# Patient Record
Sex: FEMALE | Race: BLACK OR AFRICAN AMERICAN | NOT HISPANIC OR LATINO | Employment: OTHER | URBAN - METROPOLITAN AREA
[De-identification: names, ages, dates, MRNs, and addresses within clinical notes are randomized per-mention and may not be internally consistent; named-entity substitution may affect disease eponyms.]

---

## 2024-11-19 DIAGNOSIS — D75.1 ERYTHROCYTOSIS: ICD-10-CM

## 2024-11-19 DIAGNOSIS — D72.829 LEUKOCYTOSIS: Primary | ICD-10-CM

## 2024-11-26 NOTE — PROGRESS NOTES
Referring provider: Brandi Krishna NP     Reason for consultation: Elevated WBC, Hgb     HPI:  64 year old female with history of GERD hypertension dyslipidemia here for evaluation of elevated white blood cell count and elevated hemoglobin..  Patient had labs on October 31, 2024 that showed white blood cell count 78534 hemoglobin 16 hematocrit 47.5 platelets 224 absolute neutrophil count 8160 absolute lymphocyte count 5470. She has no issues with night sweats fevers or weight loss. She is a smoker and has is trying to quit but not successful.          History reviewed. No pertinent past medical history.    History reviewed. No pertinent surgical history.    No family history on file.    Social History     Socioeconomic History    Marital status:        No current outpatient medications on file.     No current facility-administered medications for this visit.       Review of patient's allergies indicates:  Not on File      Review of systems  CONSTITUTIONAL: no fevers, no chills, no weight loss, no fatigue, no weakness  HEMATOLOGIC: no abnormal bleeding, no abnormal bruising, no drenching night sweats  ONCOLOGIC: no new masses or lumps  HEENT: no vision loss, no tinnitus or hearing loss, no nose bleeding, no dysphagia, no odynophagia  CVS: no chest pain, no palpitations, no dyspnea on exertion  RESP: no shortness of breath, no hemoptysis, no cough  GI: no nausea, no vomiting, no diarrhea, no constipation, no melena, no hematochezia, no hematemesis, no abdominal pain, no increase in abdominal girth  : no dysuria, no hematuria, no hesitancy, no scrotal swelling, no discharge  INTEGUMENT: no rashes, no abnormal bruising, no nail pitting, no hyperpigmentation  NEURO: no falls, no memory loss, no paresthesias or dysesthesias, no urofecal incontinence or retention, no loss of strength on any extremity  MSK: no back pain, no new joint pain, no joint swelling  PSYCH: no suicidal or homicidal ideation, no  depression, no insomnia, no anhedonia  ENDOCRINE: no heat or cold intolerance, no polyuria, no polydipsia      Physical Exam:  Vitals:    11/27/24 1106   BP: 127/80   Pulse: 92   Resp: 14   Temp: 97.7 °F (36.5 °C)       ECOG PS 0  GA: AAOx3, NAD  HEENT: NCAT, PERRLA, EOMI, good dentition, moist oral mucous membranes  LYMPH: no cervical, axillary or supraclavicular adenopathy  CVS: s1s2 RRR, no M/R/G  RESP: CTA b/l, no crackles, no wheezes or rhonchi  ABD: soft, NT, ND, BS+, no hepatosplenomegaly  EXT: no deformities, no pedal edema  SKIN: no rashes, warm and dry  NEURO: normal mentation, strength 5/5 on all 4 extremities, no sensory deficits    LABS:  10/31/24  white blood cell count 08398 hemoglobin 16 hematocrit 47.5 platelets 224 absolute neutrophil count 8160 absolute lymphocyte count 5470  RADIOLOGY:  None  PATHOLOGY  [facility]One  Assessment    1.) Leukocytosis  2.) Erythrocytosis.         Plan   Patient's labs were reviewed in detail and show a significant increase in white blood cell count and a very mild increase in the hematocrit.  Labs were reviewed dating as far back as 2022 and show persistent elevation of lymphocytes and neutrophils as well as hematocrit close to 50 as her baseline.  We will check a EPO level and I do not believe a JAK2 is necessary at this time.  We will check flow cytometry as she does appear to have some increased lymphocyte as well as a SPEP due to bone marrow dysfunction.  I have advised her to quit smoking and to obtain a annual CT of the chest for lung cancer screening.      A total of  45 minutes were spent in review of records, interpretation of test, coordination of care, discussion and counseling with the patient.        Portions of the record may have been created with voice recognition software. Occasional wrong-word or sound-a-like substitutions may have occurred due to the inherent limitations of voice recognition software. Read the chart carefully and recognize, using  context, where substitutions have occurred.

## 2024-11-27 ENCOUNTER — OFFICE VISIT (OUTPATIENT)
Dept: HEMATOLOGY/ONCOLOGY | Facility: CLINIC | Age: 64
End: 2024-11-27
Payer: MEDICARE

## 2024-11-27 VITALS
RESPIRATION RATE: 14 BRPM | WEIGHT: 166 LBS | HEIGHT: 62 IN | OXYGEN SATURATION: 96 % | DIASTOLIC BLOOD PRESSURE: 80 MMHG | HEART RATE: 92 BPM | SYSTOLIC BLOOD PRESSURE: 127 MMHG | TEMPERATURE: 98 F | BODY MASS INDEX: 30.55 KG/M2

## 2024-11-27 DIAGNOSIS — D75.1 ERYTHROCYTOSIS: ICD-10-CM

## 2024-11-27 DIAGNOSIS — D72.820 LYMPHOCYTOSIS: ICD-10-CM

## 2024-11-27 PROBLEM — M54.9 BACK PAIN: Status: ACTIVE | Noted: 2024-11-27

## 2024-11-27 PROBLEM — D72.829 LEUKOCYTOSIS: Status: ACTIVE | Noted: 2024-11-27

## 2024-11-27 PROBLEM — I10 HYPERTENSION: Status: ACTIVE | Noted: 2024-11-27

## 2024-11-27 PROBLEM — E78.00 HYPERCHOLESTEROLEMIA: Status: ACTIVE | Noted: 2024-11-27

## 2024-11-27 PROBLEM — F32.9 MAJOR DEPRESSIVE DISORDER, SINGLE EPISODE, UNSPECIFIED: Status: ACTIVE | Noted: 2024-11-27

## 2024-11-27 PROBLEM — Z79.02 ENCOUNTER FOR CURRENT LONG TERM USE OF ANTIPLATELET DRUG: Status: ACTIVE | Noted: 2024-11-27

## 2024-11-27 PROBLEM — I25.10 CORONARY ARTERY DISEASE: Status: ACTIVE | Noted: 2024-11-27

## 2024-11-27 PROBLEM — G62.9 NEUROPATHY: Status: ACTIVE | Noted: 2024-11-27

## 2024-11-27 PROCEDURE — 3074F SYST BP LT 130 MM HG: CPT | Mod: CPTII,,, | Performed by: INTERNAL MEDICINE

## 2024-11-27 PROCEDURE — 1159F MED LIST DOCD IN RCRD: CPT | Mod: CPTII,,, | Performed by: INTERNAL MEDICINE

## 2024-11-27 PROCEDURE — 99204 OFFICE O/P NEW MOD 45 MIN: CPT | Mod: ,,, | Performed by: INTERNAL MEDICINE

## 2024-11-27 PROCEDURE — 4010F ACE/ARB THERAPY RXD/TAKEN: CPT | Mod: CPTII,,, | Performed by: INTERNAL MEDICINE

## 2024-11-27 PROCEDURE — 3079F DIAST BP 80-89 MM HG: CPT | Mod: CPTII,,, | Performed by: INTERNAL MEDICINE

## 2024-11-27 PROCEDURE — 3008F BODY MASS INDEX DOCD: CPT | Mod: CPTII,,, | Performed by: INTERNAL MEDICINE

## 2024-11-27 RX ORDER — ASPIRIN 81 MG/1
81 TABLET ORAL
COMMUNITY

## 2024-11-27 RX ORDER — AMLODIPINE BESYLATE 10 MG/1
10 TABLET ORAL
COMMUNITY

## 2024-11-27 RX ORDER — BENAZEPRIL HYDROCHLORIDE AND HYDROCHLOROTHIAZIDE 20; 12.5 MG/1; MG/1
1 TABLET ORAL
COMMUNITY

## 2024-11-27 RX ORDER — NITROGLYCERIN 0.4 MG/1
0.4 TABLET SUBLINGUAL
COMMUNITY

## 2024-11-27 RX ORDER — BENAZEPRIL HYDROCHLORIDE 20 MG/1
20 TABLET ORAL
COMMUNITY
Start: 2024-09-23

## 2024-11-27 RX ORDER — ACETAMINOPHEN 500 MG/1
CAPSULE, LIQUID FILLED ORAL
COMMUNITY

## 2024-11-27 RX ORDER — ATORVASTATIN CALCIUM 40 MG/1
40 TABLET, FILM COATED ORAL
COMMUNITY

## 2024-11-27 RX ORDER — BENZONATATE 100 MG/1
200 CAPSULE ORAL EVERY 8 HOURS PRN
COMMUNITY
Start: 2024-11-21

## 2024-11-27 RX ORDER — OMEPRAZOLE 20 MG/1
20 CAPSULE, DELAYED RELEASE ORAL EVERY MORNING
COMMUNITY
Start: 2024-07-25

## 2025-01-07 ENCOUNTER — OFFICE VISIT (OUTPATIENT)
Dept: HEMATOLOGY/ONCOLOGY | Facility: CLINIC | Age: 65
End: 2025-01-07
Payer: MEDICARE

## 2025-01-07 VITALS
HEIGHT: 62 IN | SYSTOLIC BLOOD PRESSURE: 127 MMHG | DIASTOLIC BLOOD PRESSURE: 83 MMHG | TEMPERATURE: 98 F | OXYGEN SATURATION: 99 % | WEIGHT: 165.38 LBS | RESPIRATION RATE: 16 BRPM | HEART RATE: 83 BPM | BODY MASS INDEX: 30.44 KG/M2

## 2025-01-07 DIAGNOSIS — D72.820 LYMPHOCYTOSIS: Primary | ICD-10-CM

## 2025-01-07 PROCEDURE — 99214 OFFICE O/P EST MOD 30 MIN: CPT | Mod: ,,, | Performed by: INTERNAL MEDICINE

## 2025-01-07 PROCEDURE — 3079F DIAST BP 80-89 MM HG: CPT | Mod: CPTII,,, | Performed by: INTERNAL MEDICINE

## 2025-01-07 PROCEDURE — 1159F MED LIST DOCD IN RCRD: CPT | Mod: CPTII,,, | Performed by: INTERNAL MEDICINE

## 2025-01-07 PROCEDURE — 3074F SYST BP LT 130 MM HG: CPT | Mod: CPTII,,, | Performed by: INTERNAL MEDICINE

## 2025-01-07 NOTE — PROGRESS NOTES
Referring provider: Brandi Krishna NP      Reason for consultation: Elevated WBC, Hgb      HPI:  64 year old female with history of GERD hypertension dyslipidemia here for evaluation of elevated white blood cell count and elevated hemoglobin..  Patient had labs on October 31, 2024 that showed white blood cell count 19378 hemoglobin 16 hematocrit 47.5 platelets 224 absolute neutrophil count 8160 absolute lymphocyte count 5470. She has no issues with night sweats fevers or weight loss. She is a smoker and has is trying to quit but not successful.       Interval history:  01/07/2025: Patient here for follow up.  She had lab work done on November 27th that showed normal flow cytometry normal SPEP [place] of 17763 hemoglobin 15.7 with hematocrit of 47.3 normal folic acid levels of 10.12 B12 1027 EPO within normal limits.  She had CT  of the chest but do not have results.     History reviewed. No pertinent past medical history.    History reviewed. No pertinent surgical history.    No family history on file.    Social History     Socioeconomic History    Marital status:    Tobacco Use    Smoking status: Every Day     Current packs/day: 0.50     Types: Cigarettes    Smokeless tobacco: Never       Current Outpatient Medications   Medication Sig Dispense Refill    acetaminophen (TYLENOL) 500 mg Cap 1 capsule as needed Orally every 6 hrs      amLODIPine (NORVASC) 10 MG tablet Take 10 mg by mouth.      aspirin (SCOT LOW DOSE ASPIRIN) 81 MG EC tablet Take 81 mg by mouth.      atorvastatin (LIPITOR) 40 MG tablet Take 40 mg by mouth.      benazepriL (LOTENSIN) 20 MG tablet Take 20 mg by mouth.      benazepril-hydrochlorthiazide (LOTENSIN HCT) 20-12.5 mg per tablet Take 1 tablet by mouth.      benzonatate (TESSALON) 100 MG capsule Take 200 mg by mouth every 8 (eight) hours as needed.      nitroGLYCERIN (NITROSTAT) 0.4 MG SL tablet Place 0.4 mg under the tongue.      omeprazole (PRILOSEC) 20 MG capsule  Take 20 mg by mouth every morning.       No current facility-administered medications for this visit.       Review of patient's allergies indicates:  No Known Allergies      Review of systems  CONSTITUTIONAL: no fevers, no chills, no weight loss, no fatigue, no weakness  HEMATOLOGIC: no abnormal bleeding, no abnormal bruising, no drenching night sweats  ONCOLOGIC: no new masses or lumps  HEENT: no vision loss, no tinnitus or hearing loss, no nose bleeding, no dysphagia, no odynophagia  CVS: no chest pain, no palpitations, no dyspnea on exertion  RESP: no shortness of breath, no hemoptysis, no cough  GI: no nausea, no vomiting, no diarrhea, no constipation, no melena, no hematochezia, no hematemesis, no abdominal pain, no increase in abdominal girth  : no dysuria, no hematuria, no hesitancy, no scrotal swelling, no discharge  INTEGUMENT: no rashes, no abnormal bruising, no nail pitting, no hyperpigmentation  NEURO: no falls, no memory loss, no paresthesias or dysesthesias, no urofecal incontinence or retention, no loss of strength on any extremity  MSK: no back pain, no new joint pain, no joint swelling  PSYCH: no suicidal or homicidal ideation, no depression, no insomnia, no anhedonia  ENDOCRINE: no heat or cold intolerance, no polyuria, no polydipsia      Physical Exam:  Vitals:    01/07/25 1447   BP: 127/83   Pulse: 83   Resp: 16   Temp: 97.7 °F (36.5 °C)       ECOG PS 0  GA: AAOx3, NAD  HEENT: NCAT, PERRLA, EOMI, good dentition, moist oral mucous membranes  LYMPH: no cervical, axillary or supraclavicular adenopathy  CVS: s1s2 RRR, no M/R/G  RESP: CTA b/l, no crackles, no wheezes or rhonchi  ABD: soft, NT, ND, BS+, no hepatosplenomegaly  EXT: no deformities, no pedal edema  SKIN: no rashes, warm and dry  NEURO: normal mentation, strength 5/5 on all 4 extremities, no sensory deficits    LABS:  10/31/24  white blood cell count 58939 hemoglobin 16 hematocrit 47.5 platelets 224 absolute neutrophil count 8160  absolute lymphocyte count 5470    11/27/2024  folic acid 10 0.1 to vitamin B12 1027 creatinine 0.65 AST 18 ALT 21 white blood cell count 16070 hemoglobin 15.7 hematocrit 47.3 platelets 327 sed rate 9 EPO 3.8 SPEP normal flow cytometry within normal limits  RADIOLOGY:  None  PATHOLOGY  NOne  Assessment     1.) Leukocytosis  2.) Erythrocytosis.            Plan     1/7/25  Patient's labs show persistent elevation of white blood cell count and hematocrit but not a dangerous levels.  I would not recommend a bone marrow biopsy unless white blood cell count was greater than 20,000. She does not need phlebotomy unless the hematocrit is greater than 55.  She will follow up in 6 months with CBC Follow up on CT chest results and repeat yearly if normal       Patient's labs were reviewed in detail and show a significant increase in white blood cell count and a very mild increase in the hematocrit.  Labs were reviewed dating as far back as 2022 and show persistent elevation of lymphocytes and neutrophils as well as hematocrit close to 50 as her baseline.  We will check a EPO level and I do not believe a JAK2 is necessary at this time.  We will check flow cytometry as she does appear to have some increased lymphocyte as well as a SPEP due to bone marrow dysfunction.  I have advised her to quit smoking and to obtain a annual CT of the chest for lung cancer screening.      A total of  30 minutes were spent in review of records, interpretation of test, coordination of care, discussion and counseling with the patient.        Portions of the record may have been created with voice recognition software. Occasional wrong-word or sound-a-like substitutions may have occurred due to the inherent limitations of voice recognition software. Read the chart carefully and recognize, using context, where substitutions have occurred.

## 2025-07-07 ENCOUNTER — TELEPHONE (OUTPATIENT)
Dept: HEMATOLOGY/ONCOLOGY | Facility: CLINIC | Age: 65
End: 2025-07-07
Payer: MEDICARE

## 2025-07-07 NOTE — TELEPHONE ENCOUNTER
Called patient to confirm appointment for 7/8/25. Patient requested to reschedule due to having her grandchildren this week. Informed patient the  will call her back with a new appointment date and time. Patient verbalized understanding.

## 2025-07-07 NOTE — TELEPHONE ENCOUNTER
----- Message from Norah sent at 7/7/2025  3:36 PM CDT -----  Pt has been r/s for 7/21.  ----- Message -----  From: Annemarie Beach MA  Sent: 7/7/2025   3:31 PM CDT  To: Argelia Dee LPN; Norah Duet; Shawn Benavidez#    Called patient to confirm appointment for 7/8/25. Patient requested to reschedule due to having her grandchildren this week. Informed patient the  will call her back with a new appointment date and time. Patient verbalized understanding.

## 2025-07-17 ENCOUNTER — TELEPHONE (OUTPATIENT)
Dept: HEMATOLOGY/ONCOLOGY | Facility: CLINIC | Age: 65
End: 2025-07-17
Payer: MEDICARE

## 2025-07-17 NOTE — TELEPHONE ENCOUNTER
Called patient about labs and to confirm appointment for 7/21/25. Patient stated she will get labs done this week and confirmed appointment.

## 2025-07-21 ENCOUNTER — OFFICE VISIT (OUTPATIENT)
Dept: HEMATOLOGY/ONCOLOGY | Facility: CLINIC | Age: 65
End: 2025-07-21
Payer: MEDICARE

## 2025-07-21 VITALS
SYSTOLIC BLOOD PRESSURE: 153 MMHG | RESPIRATION RATE: 14 BRPM | BODY MASS INDEX: 30.76 KG/M2 | OXYGEN SATURATION: 99 % | HEIGHT: 62 IN | WEIGHT: 167.13 LBS | DIASTOLIC BLOOD PRESSURE: 98 MMHG | TEMPERATURE: 98 F | HEART RATE: 87 BPM

## 2025-07-21 DIAGNOSIS — D72.820 LYMPHOCYTOSIS: Primary | ICD-10-CM

## 2025-07-21 PROCEDURE — 3077F SYST BP >= 140 MM HG: CPT | Mod: CPTII,,, | Performed by: INTERNAL MEDICINE

## 2025-07-21 PROCEDURE — 3080F DIAST BP >= 90 MM HG: CPT | Mod: CPTII,,, | Performed by: INTERNAL MEDICINE

## 2025-07-21 PROCEDURE — 99214 OFFICE O/P EST MOD 30 MIN: CPT | Mod: ,,, | Performed by: INTERNAL MEDICINE

## 2025-07-21 PROCEDURE — 3008F BODY MASS INDEX DOCD: CPT | Mod: CPTII,,, | Performed by: INTERNAL MEDICINE

## 2025-07-21 PROCEDURE — 1159F MED LIST DOCD IN RCRD: CPT | Mod: CPTII,,, | Performed by: INTERNAL MEDICINE

## 2025-07-21 NOTE — PROGRESS NOTES
Referring provider: Brandi Krishna NP      Reason for consultation: Elevated WBC, Hgb      HPI:  64 year old female with history of GERD hypertension dyslipidemia here for evaluation of elevated white blood cell count and elevated hemoglobin..  Patient had labs on October 31, 2024 that showed white blood cell count 89255 hemoglobin 16 hematocrit 47.5 platelets 224 absolute neutrophil count 8160 absolute lymphocyte count 5470. She has no issues with night sweats fevers or weight loss. She is a smoker and has is trying to quit but not successful.        Interval history:  07/21/2025: Patient here for follow up today.  Her labs show stable white blood cell count 51597.  Her hematocrit is also stable at 50.6.  She has no issues in the last 6 months.  Her CT of the chest shows  no new issues and is to be repeated next month. No B symptoms     01/07/2025: Patient here for follow up.  She had lab work done on November 27th that showed normal flow cytometry normal SPEP [place] of [address] hemoglobin 15.7 with hematocrit of 47.3 normal folic acid levels of 10.12 B12 1027 EPO within normal limits.  She had CT  of the chest but do not have results.       History reviewed. No pertinent past medical history.    History reviewed. No pertinent surgical history.    Family History   Problem Relation Name Age of Onset    Diabetes Mother      Heart failure Father      Kidney failure Father         Social History     Socioeconomic History    Marital status:    Tobacco Use    Smoking status: Every Day     Current packs/day: 0.50     Types: Cigarettes    Smokeless tobacco: Never   Substance and Sexual Activity    Alcohol use: Yes    Drug use: Never       Current Medications[1]    Review of patient's allergies indicates:  No Known Allergies      Review of systems  CONSTITUTIONAL: no fevers, no chills, no weight loss, no fatigue, no weakness  HEMATOLOGIC: no abnormal bleeding, no abnormal bruising, no drenching  night sweats  ONCOLOGIC: no new masses or lumps  HEENT: no vision loss, no tinnitus or hearing loss, no nose bleeding, no dysphagia, no odynophagia  CVS: no chest pain, no palpitations, no dyspnea on exertion  RESP: no shortness of breath, no hemoptysis, no cough  GI: no nausea, no vomiting, no diarrhea, no constipation, no melena, no hematochezia, no hematemesis, no abdominal pain, no increase in abdominal girth  : no dysuria, no hematuria, no hesitancy, no scrotal swelling, no discharge  INTEGUMENT: no rashes, no abnormal bruising, no nail pitting, no hyperpigmentation  NEURO: no falls, no memory loss, no paresthesias or dysesthesias, no urofecal incontinence or retention, no loss of strength on any extremity  MSK: no back pain, no new joint pain, no joint swelling  PSYCH: no suicidal or homicidal ideation, no depression, no insomnia, no anhedonia  ENDOCRINE: no heat or cold intolerance, no polyuria, no polydipsia      Physical Exam:  Vitals:    07/21/25 0907   BP: (!) 153/98   Pulse: 87   Resp: 14   Temp: 97.7 °F (36.5 °C)       ECOG PS 0  GA: AAOx3, NAD  HEENT: NCAT, PERRLA, EOMI, good dentition, moist oral mucous membranes  LYMPH: no cervical, axillary or supraclavicular adenopathy  CVS: s1s2 RRR, no M/R/G  RESP: CTA b/l, no crackles, no wheezes or rhonchi  ABD: soft, NT, ND, BS+, no hepatosplenomegaly  EXT: no deformities, no pedal edema  SKIN: no rashes, warm and dry  NEURO: normal mentation, strength 5/5 on all 4 extremities, no sensory deficits    LABS:  7/18/25  White blood cell count 91668 hemoglobin 16.8 hematocrit 50.6 platelets 203    10/31/24  white blood cell count 95020 hemoglobin 16 hematocrit 47.5 platelets 224 absolute neutrophil count 8160 absolute lymphocyte count 5470     11/27/2024  folic acid 10 0.1 to vitamin B12 1027 creatinine 0.65 AST 18 ALT 21 white blood cell count 77550 hemoglobin 15.7 hematocrit 47.3 platelets 327 sed rate 9 EPO 3.8 SPEP normal flow cytometry within normal  limits  RADIOLOGY:  None  PATHOLOGY  NOne  Assessment     1.) Leukocytosis  2.) Erythrocytosis.            Plan    7/21/25  Labs reviewed  Repeat CBC q 6 months  No Bone marrow unless WBC > 20k  Obtain copy of CT CHEST      1/7/25  Patient's labs show persistent elevation of white blood cell count and hematocrit but not a dangerous levels.  I would not recommend a bone marrow biopsy unless white blood cell count was greater than 20,000. She does not need phlebotomy unless the hematocrit is greater than 55.  She will follow up in 6 months with CBC Follow up on CT chest results and repeat yearly if normal        Patient's labs were reviewed in detail and show a significant increase in white blood cell count and a very mild increase in the hematocrit.  Labs were reviewed dating as far back as 2022 and show persistent elevation of lymphocytes and neutrophils as well as hematocrit close to 50 as her baseline.  We will check a EPO level and I do not believe a JAK2 is necessary at this time.  We will check flow cytometry as she does appear to have some increased lymphocyte as well as a SPEP due to bone marrow dysfunction.  I have advised her to quit smoking and to obtain a annual CT of the chest for lung cancer screening.        A total of  30 minutes were spent in review of records, interpretation of test, coordination of care, discussion and counseling with the patient.        Portions of the record may have been created with voice recognition software. Occasional wrong-word or sound-a-like substitutions may have occurred due to the inherent limitations of voice recognition software. Read the chart carefully and recognize, using context, where substitutions have occurred.           [1]   Current Outpatient Medications   Medication Sig Dispense Refill    acetaminophen (TYLENOL) 500 mg Cap 1 capsule as needed Orally every 6 hrs      amLODIPine (NORVASC) 10 MG tablet Take 10 mg by mouth.      aspirin (SCOT LOW DOSE  ASPIRIN) 81 MG EC tablet Take 81 mg by mouth.      atorvastatin (LIPITOR) 40 MG tablet Take 40 mg by mouth.      benazepriL (LOTENSIN) 20 MG tablet Take 20 mg by mouth.      benazepril-hydrochlorthiazide (LOTENSIN HCT) 20-12.5 mg per tablet Take 1 tablet by mouth.      benzonatate (TESSALON) 100 MG capsule Take 200 mg by mouth every 8 (eight) hours as needed.      nitroGLYCERIN (NITROSTAT) 0.4 MG SL tablet Place 0.4 mg under the tongue.      omeprazole (PRILOSEC) 20 MG capsule Take 20 mg by mouth every morning.       No current facility-administered medications for this visit.